# Patient Record
Sex: FEMALE | Race: WHITE | NOT HISPANIC OR LATINO | Employment: FULL TIME | URBAN - NONMETROPOLITAN AREA
[De-identification: names, ages, dates, MRNs, and addresses within clinical notes are randomized per-mention and may not be internally consistent; named-entity substitution may affect disease eponyms.]

---

## 2023-03-23 ENCOUNTER — TELEMEDICINE (OUTPATIENT)
Dept: PRIMARY CARE | Facility: CLINIC | Age: 28
End: 2023-03-23
Payer: COMMERCIAL

## 2023-03-23 DIAGNOSIS — U07.1 COVID-19 VIRUS INFECTION: Primary | ICD-10-CM

## 2023-03-23 PROBLEM — K92.0 HEMATEMESIS/VOMITING BLOOD: Status: ACTIVE | Noted: 2023-03-23

## 2023-03-23 PROBLEM — F33.2: Chronic | Status: ACTIVE | Noted: 2021-07-08

## 2023-03-23 PROBLEM — N30.90 CYSTITIS: Status: ACTIVE | Noted: 2022-12-11

## 2023-03-23 PROBLEM — T14.8XXA BRUISING: Status: ACTIVE | Noted: 2018-06-21

## 2023-03-23 PROBLEM — R07.89 ATYPICAL CHEST PAIN: Status: ACTIVE | Noted: 2018-12-21

## 2023-03-23 PROBLEM — R19.7 DIARRHEA: Status: ACTIVE | Noted: 2017-05-15

## 2023-03-23 PROBLEM — J01.01 ACUTE RECURRENT MAXILLARY SINUSITIS: Status: ACTIVE | Noted: 2023-03-23

## 2023-03-23 PROBLEM — R03.0 ELEVATED BP WITHOUT DIAGNOSIS OF HYPERTENSION: Status: ACTIVE | Noted: 2018-11-12

## 2023-03-23 PROBLEM — H65.193 ACUTE NONSUPPURATIVE OTITIS MEDIA OF BOTH EARS: Status: ACTIVE | Noted: 2017-02-23

## 2023-03-23 PROBLEM — K52.9 GASTROENTERITIS: Status: ACTIVE | Noted: 2017-11-16

## 2023-03-23 PROBLEM — J20.9 ACUTE BRONCHITIS: Status: ACTIVE | Noted: 2017-11-03

## 2023-03-23 PROBLEM — F41.1 GENERALIZED ANXIETY DISORDER: Status: ACTIVE | Noted: 2022-01-12

## 2023-03-23 PROBLEM — R74.8 ABNORMAL LIVER ENZYMES: Status: ACTIVE | Noted: 2017-05-15

## 2023-03-23 PROBLEM — R51.9 CHRONIC DAILY HEADACHE: Status: ACTIVE | Noted: 2023-03-23

## 2023-03-23 PROBLEM — R10.9 FLANK PAIN: Status: ACTIVE | Noted: 2017-05-15

## 2023-03-23 PROBLEM — E66.9 OBESITY: Status: ACTIVE | Noted: 2017-05-05

## 2023-03-23 PROBLEM — M54.9 BACK PAIN: Status: ACTIVE | Noted: 2023-03-23

## 2023-03-23 PROBLEM — M25.561 ACUTE PAIN OF RIGHT KNEE: Status: ACTIVE | Noted: 2017-02-23

## 2023-03-23 PROBLEM — H60.503 ACUTE OTITIS EXTERNA OF BOTH EARS: Status: ACTIVE | Noted: 2017-05-05

## 2023-03-23 PROBLEM — R42 DIZZINESS: Status: ACTIVE | Noted: 2018-06-21

## 2023-03-23 PROBLEM — R68.89 FLU-LIKE SYMPTOMS: Status: ACTIVE | Noted: 2018-01-30

## 2023-03-23 PROBLEM — I10 ESSENTIAL HYPERTENSION: Status: ACTIVE | Noted: 2023-03-23

## 2023-03-23 PROBLEM — J30.9 ALLERGIC RHINITIS DUE TO ALLERGEN: Status: ACTIVE | Noted: 2023-03-23

## 2023-03-23 PROBLEM — R89.9 ABNORMAL LABORATORY TEST: Status: ACTIVE | Noted: 2018-07-02

## 2023-03-23 PROBLEM — R04.0 EPISTAXIS: Status: ACTIVE | Noted: 2023-03-23

## 2023-03-23 PROBLEM — G47.00 INSOMNIA, UNSPECIFIED: Status: ACTIVE | Noted: 2022-01-12

## 2023-03-23 PROBLEM — K64.9 HEMORRHOIDS: Status: ACTIVE | Noted: 2017-12-12

## 2023-03-23 PROBLEM — K92.1 BLOOD IN STOOL: Status: ACTIVE | Noted: 2017-11-16

## 2023-03-23 PROBLEM — N30.00 ACUTE CYSTITIS WITHOUT HEMATURIA: Status: ACTIVE | Noted: 2018-03-08

## 2023-03-23 PROBLEM — R10.11 ABDOMINAL PAIN, RUQ (RIGHT UPPER QUADRANT): Status: ACTIVE | Noted: 2023-03-23

## 2023-03-23 PROBLEM — M54.41 ACUTE BILATERAL LOW BACK PAIN WITH RIGHT-SIDED SCIATICA: Status: ACTIVE | Noted: 2017-02-23

## 2023-03-23 PROBLEM — M54.2 CHRONIC NECK PAIN: Status: ACTIVE | Noted: 2023-03-23

## 2023-03-23 PROBLEM — F41.9 ANXIETY: Status: ACTIVE | Noted: 2018-11-12

## 2023-03-23 PROBLEM — R11.0 NAUSEA: Status: ACTIVE | Noted: 2018-06-21

## 2023-03-23 PROBLEM — G89.29 CHRONIC NECK PAIN: Status: ACTIVE | Noted: 2023-03-23

## 2023-03-23 PROBLEM — R11.10 VOMITING: Status: ACTIVE | Noted: 2017-12-12

## 2023-03-23 PROCEDURE — 99213 OFFICE O/P EST LOW 20 MIN: CPT | Performed by: FAMILY MEDICINE

## 2023-03-23 RX ORDER — ALBUTEROL SULFATE 0.83 MG/ML
2.5 SOLUTION RESPIRATORY (INHALATION) EVERY 6 HOURS PRN
COMMUNITY
Start: 2022-01-11

## 2023-03-23 RX ORDER — CYCLOBENZAPRINE HCL 5 MG
5 TABLET ORAL 3 TIMES DAILY PRN
COMMUNITY
Start: 2021-10-06

## 2023-03-23 RX ORDER — DEXTROAMPHETAMINE SACCHARATE, AMPHETAMINE ASPARTATE, DEXTROAMPHETAMINE SULFATE AND AMPHETAMINE SULFATE 3.75; 3.75; 3.75; 3.75 MG/1; MG/1; MG/1; MG/1
15 TABLET ORAL DAILY
COMMUNITY
Start: 2021-10-11

## 2023-03-23 RX ORDER — ALBUTEROL SULFATE 90 UG/1
2 AEROSOL, METERED RESPIRATORY (INHALATION) EVERY 4 HOURS PRN
COMMUNITY
Start: 2017-10-29

## 2023-03-23 RX ORDER — ALPRAZOLAM 1 MG/1
1 TABLET ORAL NIGHTLY PRN
COMMUNITY
Start: 2022-08-17

## 2023-03-23 RX ORDER — CLONIDINE HYDROCHLORIDE 0.1 MG/1
1 TABLET ORAL NIGHTLY
COMMUNITY
Start: 2020-11-06

## 2023-03-23 RX ORDER — ESZOPICLONE 3 MG/1
3 TABLET, FILM COATED ORAL NIGHTLY
COMMUNITY

## 2023-03-23 RX ORDER — CARIPRAZINE 1.5 MG/1
1.5 CAPSULE, GELATIN COATED ORAL EVERY OTHER DAY
COMMUNITY
Start: 2022-04-21

## 2023-03-23 RX ORDER — BUPROPION HYDROCHLORIDE 150 MG/1
150 TABLET ORAL
COMMUNITY

## 2023-03-23 RX ORDER — ASCORBIC ACID 500 MG
500 TABLET ORAL
COMMUNITY
Start: 2018-03-28

## 2023-03-23 RX ORDER — BENZONATATE 200 MG/1
200 CAPSULE ORAL 3 TIMES DAILY PRN
Qty: 21 CAPSULE | Refills: 0 | Status: SHIPPED | OUTPATIENT
Start: 2023-03-23 | End: 2023-03-30

## 2023-03-23 RX ORDER — FLUOXETINE HYDROCHLORIDE 40 MG/1
40 CAPSULE ORAL
COMMUNITY

## 2023-03-23 RX ORDER — CLONIDINE HYDROCHLORIDE 0.2 MG/1
0.2 TABLET ORAL NIGHTLY
COMMUNITY

## 2023-03-23 RX ORDER — DEXTROAMPHETAMINE SACCHARATE, AMPHETAMINE ASPARTATE MONOHYDRATE, DEXTROAMPHETAMINE SULFATE AND AMPHETAMINE SULFATE 7.5; 7.5; 7.5; 7.5 MG/1; MG/1; MG/1; MG/1
15 CAPSULE, EXTENDED RELEASE ORAL
COMMUNITY
Start: 2019-06-20

## 2023-03-23 RX ORDER — FLUTICASONE PROPIONATE 50 MCG
2 SPRAY, SUSPENSION (ML) NASAL 2 TIMES DAILY
COMMUNITY
Start: 2021-11-30 | End: 2023-03-23

## 2023-03-23 RX ORDER — ARIPIPRAZOLE 2 MG/1
1 TABLET ORAL DAILY
COMMUNITY
Start: 2022-05-11

## 2023-03-27 NOTE — PROGRESS NOTES
Subjective     Diana Motley is a 27 y.o. female who presents for Covid-19 Home Monitoring Visit, Cough, and URI (Covid positive/cough/congestion).       This was completed via telephone due to the restrictions of the COVID-19 pandemic.  All issues as below were discussed and addressed but no physical exam was performed.  If it was felt that the patient should be evaluated in clinic then they were director there.  The patient/parent verbally consented to the visit.     Telemedicine/video call appointment    HPI  Home COVID test was positive for COVID-19 virus infection.  Complaining sinus congestion and tickle cough and chest congestion.  Low-grade fever.  Denies shortness of breath.  Not using Flonase.  Denies shortness of breath.  Recommended Paxlovid.  Denies pregnancy.  No history of renal/kidney disease.    Review of Systems  Dictated as above virtual visit  Objective   Virtual visit virtual visit  Physical Exam    Assessment/Plan     COVID-19 virus infection.  A started on Paxlovid.  DAvised to call 911 or to go to the emergency room as soon as possible if develops high fever or shortness of breath and or pain in the lungs with a cough and breathing and or shortness of breath and or pain in the calf muscles.  She understood verbalized understood and agreed.  Explained adverse pressure medication.    Problem List Items Addressed This Visit          Infectious/Inflammatory    COVID-19 virus infection - Primary    Relevant Medications    nirmatrelvir-ritonavir (PAXLOVID) 300 mg (150 mg x 2)-100 mg tablet therapy pack    benzonatate (Tessalon) 200 mg capsule

## 2023-04-06 ENCOUNTER — TELEMEDICINE (OUTPATIENT)
Dept: PRIMARY CARE | Facility: CLINIC | Age: 28
End: 2023-04-06
Payer: COMMERCIAL

## 2023-04-06 DIAGNOSIS — F33.2 MAJOR DEPRESSIVE DISORDER, RECURRENT, SEVERE W/O PSYCHOTIC BEHAVIOR (MULTI): ICD-10-CM

## 2023-04-06 DIAGNOSIS — U09.9 POST-COVID CHRONIC FATIGUE: ICD-10-CM

## 2023-04-06 DIAGNOSIS — R53.83 OTHER FATIGUE: ICD-10-CM

## 2023-04-06 DIAGNOSIS — N30.00 ACUTE CYSTITIS WITHOUT HEMATURIA: Primary | ICD-10-CM

## 2023-04-06 DIAGNOSIS — G93.32 POST-COVID CHRONIC FATIGUE: ICD-10-CM

## 2023-04-06 PROCEDURE — 99213 OFFICE O/P EST LOW 20 MIN: CPT | Performed by: FAMILY MEDICINE

## 2023-04-06 RX ORDER — NITROFURANTOIN 25; 75 MG/1; MG/1
100 CAPSULE ORAL 2 TIMES DAILY
Qty: 14 CAPSULE | Refills: 0 | Status: SHIPPED | OUTPATIENT
Start: 2023-04-06 | End: 2023-04-13

## 2023-04-09 NOTE — PROGRESS NOTES
Subjective     Diana Motley is a 27 y.o. female who presents for UTI (UTI).    This was completed via telephone due to the restrictions of the COVID-19 pandemic.  All issues as below were discussed and addressed but no physical exam was performed.  If it was felt that the patient should be evaluated in clinic then they were director there.  The patient/parent verbally consented to the visit.     Video call on [email or web address].    HPI  Complaining burning urination and frequent urination.  Denies flank pain.  Denies blood in the urine.  Denies nausea vomiting and diarrhea.    Complaining feeling tired.  Advised on diet exercise.  Recently she had a COVID 19 virus infection and feeling tired.  History of depression.  Denies suicidal.  Denies homicidal.  Keep appointment with specialist.            Review of Systems  Dictated as above  Objective   Virtual visit  Physical Exam  Virtual visit  Assessment/Plan   1.  Acute cystitis.  Started on medication.  Explained adverse effects of medication.  Advised to call 911 or to go to the emergency room as soon as possible if develops abdominal pain/flank pain, pain is getting worse, fever, chills, nausea and vomiting.    2.  Fatigue.  Dictated as above.    3.  Post COVID chronic fatigue.  Dictated as above  4.  Depression.  Counseled for depression.  Denies suicidal.  Denies homicidal.  Keep appointment with the specialist            Problem List Items Addressed This Visit          Genitourinary    Acute cystitis without hematuria - Primary    Relevant Medications    nitrofurantoin, macrocrystal-monohydrate, (Macrobid) 100 mg capsule       Other    Major depressive disorder, recurrent, severe w/o psychotic behavior (CMS/HCC) (Chronic)    Fatigue     Other Visit Diagnoses       Post-COVID chronic fatigue        Relevant Orders    Referral to [facility]

## 2023-05-01 RX ORDER — LEVOTHYROXINE SODIUM 112 UG/1
112 TABLET ORAL
Qty: 30 TABLET | Refills: 2 | OUTPATIENT
Start: 2023-05-01

## 2023-05-01 RX ORDER — LEVOTHYROXINE SODIUM 112 UG/1
112 TABLET ORAL
COMMUNITY
Start: 2022-12-20 | End: 2024-01-16 | Stop reason: SDUPTHER

## 2023-05-01 RX ORDER — TOPIRAMATE 100 MG/1
100 TABLET, FILM COATED ORAL NIGHTLY
COMMUNITY
Start: 2023-03-23

## 2023-05-01 RX ORDER — TOPIRAMATE 100 MG/1
100 TABLET, FILM COATED ORAL NIGHTLY
Qty: 30 TABLET | Refills: 0 | OUTPATIENT
Start: 2023-05-01

## 2024-01-14 PROBLEM — K62.5 RECTAL BLEEDING: Status: RESOLVED | Noted: 2017-12-09 | Resolved: 2024-01-14

## 2024-01-14 PROBLEM — T14.8XXA BRUISING: Status: RESOLVED | Noted: 2018-06-21 | Resolved: 2024-01-14

## 2024-01-14 PROBLEM — J01.01 ACUTE RECURRENT MAXILLARY SINUSITIS: Status: RESOLVED | Noted: 2023-03-23 | Resolved: 2024-01-14

## 2024-01-14 PROBLEM — M25.561 ACUTE PAIN OF RIGHT KNEE: Status: RESOLVED | Noted: 2017-02-23 | Resolved: 2024-01-14

## 2024-01-14 PROBLEM — R89.9 ABNORMAL LABORATORY TEST: Status: RESOLVED | Noted: 2018-07-02 | Resolved: 2024-01-14

## 2024-01-14 PROBLEM — N30.90 CYSTITIS: Status: RESOLVED | Noted: 2022-12-11 | Resolved: 2024-01-14

## 2024-01-14 PROBLEM — M54.9 BACK PAIN: Status: RESOLVED | Noted: 2023-03-23 | Resolved: 2024-01-14

## 2024-01-14 PROBLEM — R42 DIZZINESS: Status: RESOLVED | Noted: 2018-06-21 | Resolved: 2024-01-14

## 2024-01-14 PROBLEM — M54.41 ACUTE BILATERAL LOW BACK PAIN WITH RIGHT-SIDED SCIATICA: Status: RESOLVED | Noted: 2017-02-23 | Resolved: 2024-01-14

## 2024-01-14 PROBLEM — D51.0 PERNICIOUS ANEMIA: Status: RESOLVED | Noted: 2018-07-02 | Resolved: 2024-01-14

## 2024-01-14 PROBLEM — R11.10 VOMITING: Status: RESOLVED | Noted: 2017-12-12 | Resolved: 2024-01-14

## 2024-01-14 PROBLEM — L05.91 PILONIDAL CYST WITHOUT ABSCESS: Status: RESOLVED | Noted: 2017-02-23 | Resolved: 2024-01-14

## 2024-01-14 PROBLEM — R10.11 ABDOMINAL PAIN, RUQ (RIGHT UPPER QUADRANT): Status: RESOLVED | Noted: 2023-03-23 | Resolved: 2024-01-14

## 2024-01-14 PROBLEM — R68.89 FLU-LIKE SYMPTOMS: Status: RESOLVED | Noted: 2018-01-30 | Resolved: 2024-01-14

## 2024-01-14 PROBLEM — R19.7 DIARRHEA: Status: RESOLVED | Noted: 2017-05-15 | Resolved: 2024-01-14

## 2024-01-14 PROBLEM — R03.0 ELEVATED BP WITHOUT DIAGNOSIS OF HYPERTENSION: Status: RESOLVED | Noted: 2018-11-12 | Resolved: 2024-01-14

## 2024-01-14 PROBLEM — G47.00 INSOMNIA, UNSPECIFIED: Status: RESOLVED | Noted: 2022-01-12 | Resolved: 2024-01-14

## 2024-01-14 PROBLEM — R11.0 NAUSEA: Status: RESOLVED | Noted: 2018-06-21 | Resolved: 2024-01-14

## 2024-01-14 PROBLEM — J30.9 ALLERGIC RHINITIS DUE TO ALLERGEN: Status: RESOLVED | Noted: 2023-03-23 | Resolved: 2024-01-14

## 2024-01-14 PROBLEM — K92.0 HEMATEMESIS/VOMITING BLOOD: Status: RESOLVED | Noted: 2023-03-23 | Resolved: 2024-01-14

## 2024-01-14 PROBLEM — R07.89 ATYPICAL CHEST PAIN: Status: RESOLVED | Noted: 2018-12-21 | Resolved: 2024-01-14

## 2024-01-14 PROBLEM — F60.9 PERSONALITY DISORDER, UNSPECIFIED (MULTI): Chronic | Status: ACTIVE | Noted: 2022-03-14

## 2024-01-14 PROBLEM — K92.1 BLOOD IN STOOL: Status: RESOLVED | Noted: 2017-11-16 | Resolved: 2024-01-14

## 2024-01-14 PROBLEM — J20.9 ACUTE BRONCHITIS: Status: RESOLVED | Noted: 2017-11-03 | Resolved: 2024-01-14

## 2024-01-14 PROBLEM — E66.9 OBESITY: Status: RESOLVED | Noted: 2017-05-05 | Resolved: 2024-01-14

## 2024-01-14 PROBLEM — M25.50 POLYARTHRALGIA: Status: ACTIVE | Noted: 2024-01-14

## 2024-01-14 PROBLEM — U07.1 COVID-19 VIRUS INFECTION: Status: RESOLVED | Noted: 2023-03-23 | Resolved: 2024-01-14

## 2024-01-14 PROBLEM — H65.193 ACUTE NONSUPPURATIVE OTITIS MEDIA OF BOTH EARS: Status: RESOLVED | Noted: 2017-02-23 | Resolved: 2024-01-14

## 2024-01-14 PROBLEM — N30.00 ACUTE CYSTITIS WITHOUT HEMATURIA: Status: RESOLVED | Noted: 2018-03-08 | Resolved: 2024-01-14

## 2024-01-14 PROBLEM — R51.9 CHRONIC DAILY HEADACHE: Status: RESOLVED | Noted: 2023-03-23 | Resolved: 2024-01-14

## 2024-01-14 PROBLEM — R04.0 EPISTAXIS: Status: RESOLVED | Noted: 2023-03-23 | Resolved: 2024-01-14

## 2024-01-14 PROBLEM — H60.503 ACUTE OTITIS EXTERNA OF BOTH EARS: Status: RESOLVED | Noted: 2017-05-05 | Resolved: 2024-01-14

## 2024-01-14 PROBLEM — R10.9 FLANK PAIN: Status: RESOLVED | Noted: 2017-05-15 | Resolved: 2024-01-14

## 2024-01-14 PROBLEM — K52.9 GASTROENTERITIS: Status: RESOLVED | Noted: 2017-11-16 | Resolved: 2024-01-14

## 2024-01-15 NOTE — PROGRESS NOTES
Subjective     HPI   Diana Motley is a 28 y.o. year old female patient with presenting to clinic with concern for   Chief Complaint   Patient presents with    New Patient Visit       Diana presents to clinic to establish care as a new patient. She was formerly seen by Dr. Enriquez.     Unintended weight loss 15lbs in the past 6 months.     Needs preop clearance for dental extractions. Will need hematology clearance also. Due for labs today.    Hx - Coagulation factor XII, also known as Dara factor- rare bleeding disorder, but it causes abnormal clotting rather than bleeding.    Dry mouth, intermittent mouth sores. Dental damaged, has feeling some pain in one left upper tooth x2 days.     Patient Active Problem List   Diagnosis    Abnormal liver enzymes    Acquired hypothyroidism    Allergic rhinitis    Amenorrhea due to Depo Provera    Anxiety    Asthma    Chronic neck pain    Essential hypertension    Fatigue    Generalized anxiety disorder    GERD (gastroesophageal reflux disease)    Hemorrhoids    Insomnia due to medical condition    Major depressive disorder, recurrent, severe w/o psychotic behavior (CMS/HCC)    Chronic migraine    Migraine headache    ADHD (attention deficit hyperactivity disorder)    Overactive bladder    Personality disorder, unspecified (CMS/HCC)    Polyarthralgia       Past Medical History:   Diagnosis Date    Attention-deficit hyperactivity disorder, unspecified type     Adult ADHD    COVID-19 virus infection 03/23/2023    Fissure in ano 12/01/2014    Ganglion, right wrist     Ganglion of right wrist    Hematemesis/vomiting blood 03/23/2023    Other specified health status     No pertinent past medical history    Overactive bladder     OAB (overactive bladder)    Pernicious anemia 07/02/2018    Personal history of other endocrine, nutritional and metabolic disease     History of hypothyroidism    Pilonidal cyst without abscess 02/23/2017    Rectal bleeding 12/09/2017    Sciatica  02/19/2015      Past Surgical History:   Procedure Laterality Date    COLONOSCOPY  12/13/2017    Colonoscopy    OTHER SURGICAL HISTORY  12/13/2017    Anal Fissurectomy      Family History   Problem Relation Name Age of Onset    No Known Problems Mother      No Known Problems Father        Social History     Tobacco Use    Smoking status: Never    Smokeless tobacco: Never   Substance Use Topics    Alcohol use: Not Currently        Current Outpatient Medications:     albuterol 2.5 mg /3 mL (0.083 %) nebulizer solution, Inhale 3 mL (2.5 mg) every 6 hours if needed., Disp: , Rfl:     albuterol 90 mcg/actuation inhaler, Inhale 2 puffs every 4 hours if needed for shortness of breath., Disp: , Rfl:     amphetamine-dextroamphetamine XR (Adderall XR) 30 mg 24 hr capsule, Take 15 mg by mouth once daily in the morning. Take before meals., Disp: , Rfl:     buPROPion XL (Wellbutrin XL) 150 mg 24 hr tablet, Take 1 tablet (150 mg) by mouth once daily in the morning. Take before meals., Disp: , Rfl:     cloNIDine (Catapres) 0.2 mg tablet, Take 1 tablet (0.2 mg) by mouth once daily at bedtime., Disp: , Rfl:     eszopiclone (Lunesta) 3 mg tablet, Take 1 tablet (3 mg) by mouth once daily at bedtime., Disp: , Rfl:     FLUoxetine (PROzac) 40 mg capsule, Take 1 capsule (40 mg) by mouth once daily., Disp: , Rfl:     hydrOXYzine pamoate (Vistaril) 50 mg capsule, TAKE 1 CAPSULE BY MOUTH EVERY 6 HOURS AS NEEDED FOR RESTLESSNESS, Disp: , Rfl:     levothyroxine (Synthroid, Levoxyl) 112 mcg tablet, Take 1 tablet (112 mcg) by mouth., Disp: , Rfl:     topiramate (Topamax) 100 mg tablet, Take 1 tablet (100 mg) by mouth once daily at bedtime., Disp: , Rfl:     traZODone (Desyrel) 50 mg tablet, TAKE 1 TABLET BY MOUTH AT BEDTIME MAY REPEAT X 1 AS NEEDED FOR INSOMNIA, Disp: , Rfl:     ALPRAZolam (Xanax) 1 mg tablet, Take 1 tablet (1 mg) by mouth as needed at bedtime for anxiety., Disp: , Rfl:     amphetamine-dextroamphetamine (Adderall) 15 mg tablet,  "Take 1 tablet (15 mg) by mouth once daily., Disp: , Rfl:     ARIPiprazole (Abilify) 2 mg tablet, Take 1 tablet (2 mg) by mouth once daily., Disp: , Rfl:     ascorbic acid (Vitamin C) 500 mg tablet, Take 1 tablet (500 mg) by mouth., Disp: , Rfl:     cloNIDine (Catapres) 0.1 mg tablet, Take 1 tablet (0.1 mg) by mouth once daily at bedtime., Disp: , Rfl:     cyclobenzaprine (Flexeril) 5 mg tablet, Take 1 tablet (5 mg) by mouth 3 times a day as needed., Disp: , Rfl:     Vraylar 1.5 mg capsule, Take 1 capsule (1.5 mg) by mouth every other day., Disp: , Rfl:      Review of Systems  Constitutional: Denies fever  HEENT: Denies ST, earache  CVS: Denies Chest pain  Pulmonary: Denies wheezing, SOB  GI: Denies N/V  : Denies dysuria  Musculoskeletal:  Denies myalgia  Neuro: Denies focal weakness or numbness.  Skin: Denies Rashes.  *Review of Systems is negative unless otherwise mentioned in HPI or ROS above.    Objective   /74   Pulse 95   Temp 37 °C (98.6 °F)   Ht 1.549 m (5' 1\")   Wt 58.5 kg (129 lb)   SpO2 98%   BMI 24.37 kg/m²  reviewed Body mass index is 24.37 kg/m².     Physical Exam  Constitutional: NAD.  Resting comfortably.  Head: Atraumatic, normocephalic.  ENT: Moist oral mucosa. Nasal mucosa wnl. No visible dental abscess.  Cardiac: Regular rate & rhythm.   Pulmonary: Lungs clear bilat  GI: Soft, Nontender, nondistended.   Musculoskeletal: No peripheral edema.   Skin: No evidence of trauma. No rashes  Psych: Intact judgement and insight.    .Assessment/Plan   Problem List Items Addressed This Visit    None  Visit Diagnoses         Codes    JOCELYN positive    -  Primary R76.8    Relevant Orders    Referral to Rheumatology    Hypothyroidism, unspecified type     E03.9    Relevant Medications    levothyroxine (Synthroid, Levoxyl) 112 mcg tablet    Malar rash     R21    Relevant Orders    Referral to Rheumatology    Recurrent oral ulcers     K13.79    Relevant Orders    Referral to Rheumatology    Dry mouth " and eyes     R68.2, H04.123    Relevant Orders    Referral to Rheumatology    Dental infection     K04.7    Relevant Medications    clindamycin (Cleocin) 300 mg capsule    Borderline hyperlipidemia     E78.5    Relevant Orders    CBC    Comprehensive Metabolic Panel    TSH with reflex to Free T4 if abnormal    Lipid Panel    Vitamin D insufficiency     E55.9    Relevant Orders    Vitamin D 25-Hydroxy,Total (for eval of Vitamin D levels)    Insomnia, unspecified type     G47.00    Relevant Orders    Referral to Adult Sleep Medicine

## 2024-01-16 ENCOUNTER — OFFICE VISIT (OUTPATIENT)
Dept: PRIMARY CARE | Facility: CLINIC | Age: 29
End: 2024-01-16
Payer: COMMERCIAL

## 2024-01-16 VITALS
BODY MASS INDEX: 24.35 KG/M2 | OXYGEN SATURATION: 98 % | HEIGHT: 61 IN | DIASTOLIC BLOOD PRESSURE: 74 MMHG | SYSTOLIC BLOOD PRESSURE: 110 MMHG | WEIGHT: 129 LBS | HEART RATE: 95 BPM | TEMPERATURE: 98.6 F

## 2024-01-16 DIAGNOSIS — Z12.9 SCREENING FOR CANCER: ICD-10-CM

## 2024-01-16 DIAGNOSIS — E78.5 BORDERLINE HYPERLIPIDEMIA: ICD-10-CM

## 2024-01-16 DIAGNOSIS — E55.9 VITAMIN D INSUFFICIENCY: ICD-10-CM

## 2024-01-16 DIAGNOSIS — R76.8 ANA POSITIVE: Primary | ICD-10-CM

## 2024-01-16 DIAGNOSIS — E03.9 HYPOTHYROIDISM, UNSPECIFIED TYPE: ICD-10-CM

## 2024-01-16 DIAGNOSIS — R68.2 DRY MOUTH AND EYES: ICD-10-CM

## 2024-01-16 DIAGNOSIS — K04.7 DENTAL INFECTION: ICD-10-CM

## 2024-01-16 DIAGNOSIS — R21 MALAR RASH: ICD-10-CM

## 2024-01-16 DIAGNOSIS — G47.00 INSOMNIA, UNSPECIFIED TYPE: ICD-10-CM

## 2024-01-16 DIAGNOSIS — H04.123 DRY MOUTH AND EYES: ICD-10-CM

## 2024-01-16 DIAGNOSIS — K13.79 RECURRENT ORAL ULCERS: ICD-10-CM

## 2024-01-16 PROCEDURE — 1036F TOBACCO NON-USER: CPT | Performed by: PHYSICIAN ASSISTANT

## 2024-01-16 PROCEDURE — 3074F SYST BP LT 130 MM HG: CPT | Performed by: PHYSICIAN ASSISTANT

## 2024-01-16 PROCEDURE — 99204 OFFICE O/P NEW MOD 45 MIN: CPT | Performed by: PHYSICIAN ASSISTANT

## 2024-01-16 PROCEDURE — 3078F DIAST BP <80 MM HG: CPT | Performed by: PHYSICIAN ASSISTANT

## 2024-01-16 RX ORDER — TRAZODONE HYDROCHLORIDE 50 MG/1
TABLET ORAL
COMMUNITY

## 2024-01-16 RX ORDER — CLINDAMYCIN HYDROCHLORIDE 300 MG/1
300 CAPSULE ORAL 2 TIMES DAILY
Qty: 20 CAPSULE | Refills: 0 | Status: SHIPPED | OUTPATIENT
Start: 2024-01-16 | End: 2024-01-26

## 2024-01-16 RX ORDER — HYDROXYZINE PAMOATE 50 MG/1
CAPSULE ORAL
COMMUNITY

## 2024-01-16 RX ORDER — LEVOTHYROXINE SODIUM 112 UG/1
112 TABLET ORAL
Qty: 90 TABLET | Refills: 3 | Status: SHIPPED | OUTPATIENT
Start: 2024-01-16 | End: 2024-05-31 | Stop reason: ALTCHOICE

## 2024-01-16 ASSESSMENT — PATIENT HEALTH QUESTIONNAIRE - PHQ9
SUM OF ALL RESPONSES TO PHQ9 QUESTIONS 1 AND 2: 0
2. FEELING DOWN, DEPRESSED OR HOPELESS: NOT AT ALL
1. LITTLE INTEREST OR PLEASURE IN DOING THINGS: NOT AT ALL

## 2024-04-01 ENCOUNTER — TELEPHONE (OUTPATIENT)
Dept: PRIMARY CARE | Facility: CLINIC | Age: 29
End: 2024-04-01
Payer: COMMERCIAL

## 2024-04-23 ENCOUNTER — APPOINTMENT (OUTPATIENT)
Dept: PRIMARY CARE | Facility: CLINIC | Age: 29
End: 2024-04-23
Payer: COMMERCIAL

## 2024-05-30 ENCOUNTER — LAB (OUTPATIENT)
Dept: LAB | Facility: LAB | Age: 29
End: 2024-05-30
Payer: COMMERCIAL

## 2024-05-30 DIAGNOSIS — E78.5 BORDERLINE HYPERLIPIDEMIA: ICD-10-CM

## 2024-05-30 DIAGNOSIS — E55.9 VITAMIN D INSUFFICIENCY: ICD-10-CM

## 2024-05-30 LAB
25(OH)D3 SERPL-MCNC: 42 NG/ML (ref 30–100)
ALBUMIN SERPL BCP-MCNC: 4.6 G/DL (ref 3.4–5)
ALP SERPL-CCNC: 53 U/L (ref 33–110)
ALT SERPL W P-5'-P-CCNC: 10 U/L (ref 7–45)
ANION GAP SERPL CALC-SCNC: 12 MMOL/L (ref 10–20)
AST SERPL W P-5'-P-CCNC: 12 U/L (ref 9–39)
BILIRUB SERPL-MCNC: 0.4 MG/DL (ref 0–1.2)
BUN SERPL-MCNC: 12 MG/DL (ref 6–23)
CALCIUM SERPL-MCNC: 9.7 MG/DL (ref 8.6–10.3)
CHLORIDE SERPL-SCNC: 105 MMOL/L (ref 98–107)
CHOLEST SERPL-MCNC: 143 MG/DL (ref 0–199)
CHOLESTEROL/HDL RATIO: 2.6
CO2 SERPL-SCNC: 25 MMOL/L (ref 21–32)
CREAT SERPL-MCNC: 1.04 MG/DL (ref 0.5–1.05)
EGFRCR SERPLBLD CKD-EPI 2021: 75 ML/MIN/1.73M*2
ERYTHROCYTE [DISTWIDTH] IN BLOOD BY AUTOMATED COUNT: 13.4 % (ref 11.5–14.5)
GLUCOSE SERPL-MCNC: 89 MG/DL (ref 74–99)
HCT VFR BLD AUTO: 40.2 % (ref 36–46)
HDLC SERPL-MCNC: 55.7 MG/DL
HGB BLD-MCNC: 13.5 G/DL (ref 12–16)
LDLC SERPL CALC-MCNC: 78 MG/DL
MCH RBC QN AUTO: 29.4 PG (ref 26–34)
MCHC RBC AUTO-ENTMCNC: 33.6 G/DL (ref 32–36)
MCV RBC AUTO: 88 FL (ref 80–100)
NON HDL CHOLESTEROL: 87 MG/DL (ref 0–149)
NRBC BLD-RTO: 0 /100 WBCS (ref 0–0)
PLATELET # BLD AUTO: 304 X10*3/UL (ref 150–450)
POTASSIUM SERPL-SCNC: 3.4 MMOL/L (ref 3.5–5.3)
PROT SERPL-MCNC: 7.8 G/DL (ref 6.4–8.2)
RBC # BLD AUTO: 4.59 X10*6/UL (ref 4–5.2)
SODIUM SERPL-SCNC: 139 MMOL/L (ref 136–145)
T4 FREE SERPL-MCNC: 1.01 NG/DL (ref 0.61–1.12)
TRIGL SERPL-MCNC: 47 MG/DL (ref 0–149)
TSH SERPL-ACNC: 4.33 MIU/L (ref 0.44–3.98)
VLDL: 9 MG/DL (ref 0–40)
WBC # BLD AUTO: 5.5 X10*3/UL (ref 4.4–11.3)

## 2024-05-30 PROCEDURE — 82306 VITAMIN D 25 HYDROXY: CPT

## 2024-05-30 PROCEDURE — 36415 COLL VENOUS BLD VENIPUNCTURE: CPT

## 2024-05-30 NOTE — PROGRESS NOTES
Subjective     HPI   Diana Motley is a 28 y.o. year old female patient with presenting to clinic with concern for   Chief Complaint   Patient presents with    Follow-up     3 mth. Has not been able to make all her referral scheduled appt yet but will work on them. Appetite not great.     Thyroid Problem     Possible. Body feels weird.     Bradycardia     Low heart rate on Monday was in 40s. Felt like she was going to pass out. Mouth is dry. Trouble sleeping. Mind foggy.     Ear Problem     Right ear has scarring. Feels like she has water in her ear if she turns in her head.     Suspicious Skin Lesion     Mole in groin area.        Has felt down, tired. Depressed.  HR has been low when standing at times. Watch noted HR in 40s and she felt dizzy. Sat down and felt better. Period has lasted longer than usual. Usually 3 days, now on day #6.    Has upcoming psych appointment. Has felt more tired and depressed. Please discuss meds with psychiatrist.      Hx - Coagulation factor XII, also known as Dara factor- rare bleeding disorder, but it causes abnormal clotting rather than bleeding.      Patient Active Problem List   Diagnosis    Abnormal liver enzymes    Acquired hypothyroidism    Allergic rhinitis    Amenorrhea due to Depo Provera    Anxiety    Asthma ([facility])    Chronic neck pain    Essential hypertension    Fatigue    Generalized anxiety disorder    GERD (gastroesophageal reflux disease)    Hemorrhoids    Insomnia due to medical condition    Major depressive disorder, recurrent, severe w/o psychotic behavior (Multi)    Chronic migraine    Migraine headache    ADHD (attention deficit hyperactivity disorder)    Overactive bladder    Personality disorder, unspecified (Multi)    Polyarthralgia       Past Medical History:   Diagnosis Date    Attention-deficit hyperactivity disorder, unspecified type     Adult ADHD    COVID-19 virus infection 03/23/2023    Fissure in ano 12/01/2014    Ganglion, right wrist      Ganglion of right wrist    Hematemesis/vomiting blood 03/23/2023    Other specified health status     No pertinent past medical history    Overactive bladder     OAB (overactive bladder)    Pernicious anemia 07/02/2018    Personal history of other endocrine, nutritional and metabolic disease     History of hypothyroidism    Pilonidal cyst without abscess 02/23/2017    Rectal bleeding 12/09/2017    Sciatica 02/19/2015      Past Surgical History:   Procedure Laterality Date    COLONOSCOPY  12/13/2017    Colonoscopy    OTHER SURGICAL HISTORY  12/13/2017    Anal Fissurectomy      Family History   Problem Relation Name Age of Onset    No Known Problems Mother      No Known Problems Father        Social History     Tobacco Use    Smoking status: Never    Smokeless tobacco: Never   Substance Use Topics    Alcohol use: Not Currently        Current Outpatient Medications:     albuterol 2.5 mg /3 mL (0.083 %) nebulizer solution, Inhale 3 mL (2.5 mg) every 6 hours if needed., Disp: , Rfl:     albuterol 90 mcg/actuation inhaler, Inhale 2 puffs every 4 hours if needed for shortness of breath., Disp: , Rfl:     ALPRAZolam (Xanax) 1 mg tablet, Take 1 tablet (1 mg) by mouth as needed at bedtime for anxiety., Disp: , Rfl:     amphetamine-dextroamphetamine (Adderall) 15 mg tablet, Take 1 tablet (15 mg) by mouth once daily., Disp: , Rfl:     amphetamine-dextroamphetamine XR (Adderall XR) 30 mg 24 hr capsule, Take 15 mg by mouth once daily in the morning. Take before meals., Disp: , Rfl:     ARIPiprazole (Abilify) 2 mg tablet, Take 1 tablet (2 mg) by mouth once daily., Disp: , Rfl:     ascorbic acid (Vitamin C) 500 mg tablet, Take 1 tablet (500 mg) by mouth., Disp: , Rfl:     buPROPion XL (Wellbutrin XL) 150 mg 24 hr tablet, Take 1 tablet (150 mg) by mouth once daily in the morning. Take before meals., Disp: , Rfl:     cloNIDine (Catapres) 0.1 mg tablet, Take 1 tablet (0.1 mg) by mouth once daily at bedtime., Disp: , Rfl:     cloNIDine  "(Catapres) 0.2 mg tablet, Take 1 tablet (0.2 mg) by mouth once daily at bedtime., Disp: , Rfl:     cyclobenzaprine (Flexeril) 5 mg tablet, Take 1 tablet (5 mg) by mouth 3 times a day as needed., Disp: , Rfl:     eszopiclone (Lunesta) 3 mg tablet, Take 1 tablet (3 mg) by mouth once daily at bedtime., Disp: , Rfl:     FLUoxetine (PROzac) 40 mg capsule, Take 1 capsule (40 mg) by mouth once daily., Disp: , Rfl:     hydrOXYzine pamoate (Vistaril) 50 mg capsule, TAKE 1 CAPSULE BY MOUTH EVERY 6 HOURS AS NEEDED FOR RESTLESSNESS, Disp: , Rfl:     topiramate (Topamax) 100 mg tablet, Take 1 tablet (100 mg) by mouth once daily at bedtime., Disp: , Rfl:     traZODone (Desyrel) 50 mg tablet, TAKE 1 TABLET BY MOUTH AT BEDTIME MAY REPEAT X 1 AS NEEDED FOR INSOMNIA, Disp: , Rfl:     Vraylar 1.5 mg capsule, Take 1 capsule (1.5 mg) by mouth every other day., Disp: , Rfl:     levothyroxine (Synthroid, Levoxyl) 125 mcg tablet, Take 1 tablet (125 mcg) by mouth early in the morning.. Take on an empty stomach at the same time each day, either 30 to 60 minutes prior to breakfast, Disp: 90 tablet, Rfl: 3     Review of Systems  Constitutional: Denies fever  HEENT: Denies ST, earache  CVS: Denies Chest pain  Pulmonary: Denies wheezing, SOB  GI: Denies N/V  : Denies dysuria  Musculoskeletal:  Denies myalgia  Neuro: Denies focal weakness or numbness.  Skin: Denies Rashes.  *Review of Systems is negative unless otherwise mentioned in HPI or ROS above.    Objective   /70   Pulse 88   Temp 36.4 °C (97.6 °F)   Ht 1.549 m (5' 1\")   Wt 61.5 kg (135 lb 9.6 oz)   SpO2 92%   BMI 25.62 kg/m²  reviewed Body mass index is 25.62 kg/m².     Physical Exam  Constitutional: NAD.  Resting comfortably.  Head: Atraumatic, normocephalic.  ENT: Moist oral mucosa. Nasal mucosa wnl. R EAC swelling. Unable to visualize TM. L EAC and TM wnl.   Cardiac: Regular rate & rhythm. No ectopy. No murmur.  Pulmonary: Lungs clear bilat  GI: Soft, Nontender, " nondistended.   Musculoskeletal: No peripheral edema.   Skin: No evidence of trauma. No rashes. Small macular brown mole suprapubic area. Symmetrical, uniformly colored.  Psych: Intact judgement and insight.    .Assessment/Plan   Problem List Items Addressed This Visit    None  Visit Diagnoses         Codes    Hypothyroidism, unspecified type    -  Primary E03.9    Relevant Medications    levothyroxine (Synthroid, Levoxyl) 125 mcg tablet    Acute otitis externa of right ear, unspecified type     H60.501    Relevant Medications    ciprofloxacin-dexamethasone (CiproDEX) otic suspension    Nausea     R11.0    Relevant Medications    ondansetron ODT (Zofran-ODT) 4 mg disintegrating tablet    Hypokalemia     E87.6    Relevant Medications    potassium chloride CR (Klor-Con M20) 20 mEq ER tablet    Bradycardia     R00.1    Relevant Orders    Referral to Cardiology

## 2024-05-31 ENCOUNTER — OFFICE VISIT (OUTPATIENT)
Dept: PRIMARY CARE | Facility: CLINIC | Age: 29
End: 2024-05-31
Payer: COMMERCIAL

## 2024-05-31 VITALS
HEIGHT: 61 IN | DIASTOLIC BLOOD PRESSURE: 70 MMHG | TEMPERATURE: 97.6 F | OXYGEN SATURATION: 92 % | HEART RATE: 88 BPM | SYSTOLIC BLOOD PRESSURE: 104 MMHG | BODY MASS INDEX: 25.6 KG/M2 | WEIGHT: 135.6 LBS

## 2024-05-31 DIAGNOSIS — E03.9 HYPOTHYROIDISM, UNSPECIFIED TYPE: ICD-10-CM

## 2024-05-31 DIAGNOSIS — H60.501 ACUTE OTITIS EXTERNA OF RIGHT EAR, UNSPECIFIED TYPE: ICD-10-CM

## 2024-05-31 DIAGNOSIS — E03.9 HYPOTHYROIDISM, UNSPECIFIED TYPE: Primary | ICD-10-CM

## 2024-05-31 DIAGNOSIS — E87.6 HYPOKALEMIA: ICD-10-CM

## 2024-05-31 DIAGNOSIS — R00.1 BRADYCARDIA: ICD-10-CM

## 2024-05-31 DIAGNOSIS — R11.0 NAUSEA: ICD-10-CM

## 2024-05-31 PROCEDURE — 3078F DIAST BP <80 MM HG: CPT | Performed by: PHYSICIAN ASSISTANT

## 2024-05-31 PROCEDURE — 3074F SYST BP LT 130 MM HG: CPT | Performed by: PHYSICIAN ASSISTANT

## 2024-05-31 PROCEDURE — 99214 OFFICE O/P EST MOD 30 MIN: CPT | Performed by: PHYSICIAN ASSISTANT

## 2024-05-31 PROCEDURE — 1036F TOBACCO NON-USER: CPT | Performed by: PHYSICIAN ASSISTANT

## 2024-05-31 RX ORDER — ONDANSETRON 4 MG/1
4 TABLET, ORALLY DISINTEGRATING ORAL EVERY 8 HOURS PRN
Qty: 9 TABLET | Refills: 0 | Status: SHIPPED | OUTPATIENT
Start: 2024-05-31 | End: 2024-06-07

## 2024-05-31 RX ORDER — LEVOTHYROXINE SODIUM 125 UG/1
125 TABLET ORAL DAILY
Qty: 90 TABLET | Refills: 3 | Status: SHIPPED | OUTPATIENT
Start: 2024-05-31 | End: 2025-05-31

## 2024-05-31 RX ORDER — LEVOTHYROXINE SODIUM 112 UG/1
112 TABLET ORAL
Qty: 90 TABLET | Refills: 3 | Status: CANCELLED | OUTPATIENT
Start: 2024-05-31 | End: 2025-05-31

## 2024-05-31 RX ORDER — CIPROFLOXACIN AND DEXAMETHASONE 3; 1 MG/ML; MG/ML
4 SUSPENSION/ DROPS AURICULAR (OTIC) 2 TIMES DAILY
Qty: 7.5 ML | Refills: 0 | Status: SHIPPED | OUTPATIENT
Start: 2024-05-31 | End: 2024-06-07

## 2024-05-31 RX ORDER — POTASSIUM CHLORIDE 20 MEQ/1
20 TABLET, EXTENDED RELEASE ORAL 2 TIMES DAILY
Qty: 6 TABLET | Refills: 0 | Status: SHIPPED | OUTPATIENT
Start: 2024-05-31 | End: 2024-06-03

## 2024-07-17 ENCOUNTER — APPOINTMENT (OUTPATIENT)
Dept: SLEEP MEDICINE | Facility: CLINIC | Age: 29
End: 2024-07-17
Payer: COMMERCIAL

## 2024-07-17 VITALS
DIASTOLIC BLOOD PRESSURE: 85 MMHG | OXYGEN SATURATION: 99 % | BODY MASS INDEX: 25.89 KG/M2 | SYSTOLIC BLOOD PRESSURE: 131 MMHG | HEART RATE: 81 BPM | WEIGHT: 137 LBS

## 2024-07-17 DIAGNOSIS — E66.3 OVERWEIGHT (BMI 25.0-29.9): ICD-10-CM

## 2024-07-17 DIAGNOSIS — G47.10 HYPERSOMNIA: ICD-10-CM

## 2024-07-17 DIAGNOSIS — I10 ESSENTIAL HYPERTENSION: ICD-10-CM

## 2024-07-17 DIAGNOSIS — F33.2 MAJOR DEPRESSIVE DISORDER, RECURRENT, SEVERE W/O PSYCHOTIC BEHAVIOR (MULTI): Chronic | ICD-10-CM

## 2024-07-17 DIAGNOSIS — Z72.821 INADEQUATE SLEEP HYGIENE: ICD-10-CM

## 2024-07-17 DIAGNOSIS — G47.9 SLEEP DISTURBANCE: ICD-10-CM

## 2024-07-17 DIAGNOSIS — G47.19 EXCESSIVE DAYTIME SLEEPINESS: ICD-10-CM

## 2024-07-17 DIAGNOSIS — F41.9 ANXIETY: ICD-10-CM

## 2024-07-17 DIAGNOSIS — Z91.89 AT RISK FOR NARCOLEPSY: ICD-10-CM

## 2024-07-17 DIAGNOSIS — G47.00 INSOMNIA, UNSPECIFIED TYPE: Primary | ICD-10-CM

## 2024-07-17 DIAGNOSIS — Z78.9 NEVER SMOKED TOBACCO: ICD-10-CM

## 2024-07-17 DIAGNOSIS — R53.83 FATIGUE, UNSPECIFIED TYPE: ICD-10-CM

## 2024-07-17 PROCEDURE — 3075F SYST BP GE 130 - 139MM HG: CPT

## 2024-07-17 PROCEDURE — 99204 OFFICE O/P NEW MOD 45 MIN: CPT

## 2024-07-17 PROCEDURE — 1036F TOBACCO NON-USER: CPT

## 2024-07-17 PROCEDURE — 3079F DIAST BP 80-89 MM HG: CPT

## 2024-07-17 ASSESSMENT — PATIENT HEALTH QUESTIONNAIRE - PHQ9
1. LITTLE INTEREST OR PLEASURE IN DOING THINGS: MORE THAN HALF THE DAYS
SUM OF ALL RESPONSES TO PHQ9 QUESTIONS 1 AND 2: 3
2. FEELING DOWN, DEPRESSED OR HOPELESS: SEVERAL DAYS

## 2024-07-17 ASSESSMENT — ANXIETY QUESTIONNAIRES
2. NOT BEING ABLE TO STOP OR CONTROL WORRYING: NOT AT ALL
3. WORRYING TOO MUCH ABOUT DIFFERENT THINGS: NOT AT ALL
6. BECOMING EASILY ANNOYED OR IRRITABLE: SEVERAL DAYS
5. BEING SO RESTLESS THAT IT IS HARD TO SIT STILL: NEARLY EVERY DAY
1. FEELING NERVOUS, ANXIOUS, OR ON EDGE: SEVERAL DAYS
7. FEELING AFRAID AS IF SOMETHING AWFUL MIGHT HAPPEN: NOT AT ALL
GAD7 TOTAL SCORE: 6
4. TROUBLE RELAXING: SEVERAL DAYS

## 2024-07-17 ASSESSMENT — SLEEP AND FATIGUE QUESTIONNAIRES
SITING INACTIVE IN A PUBLIC PLACE LIKE A CLASS ROOM OR A MOVIE THEATER: WOULD NEVER DOZE
DIFFICULTY_FALLING_ASLEEP: VERY SEVERE
ESS-CHAD TOTAL SCORE: 0
SLEEP_PROBLEM_NOTICEABLE_TO_OTHERS: VERY MUCH NOTICEABLE
DIFFICULTY_STAYING_ASLEEP: VERY SEVERE
WAKING_TOO_EARLY: VERY SEVERE
SATISFACTION_WITH_CURRENT_SLEEP_PATTERN: DISSATISFIED
HOW LIKELY ARE YOU TO NOD OFF OR FALL ASLEEP IN A CAR, WHILE STOPPED FOR A FEW MINUTES IN TRAFFIC: WOULD NEVER DOZE
HOW LIKELY ARE YOU TO NOD OFF OR FALL ASLEEP WHILE SITTING QUIETLY AFTER LUNCH WITHOUT ALCOHOL: WOULD NEVER DOZE
HOW LIKELY ARE YOU TO NOD OFF OR FALL ASLEEP WHILE SITTING AND READING: WOULD NEVER DOZE
HOW LIKELY ARE YOU TO NOD OFF OR FALL ASLEEP WHILE SITTING AND TALKING TO SOMEONE: WOULD NEVER DOZE
HOW LIKELY ARE YOU TO NOD OFF OR FALL ASLEEP WHILE LYING DOWN TO REST IN THE AFTERNOON WHEN CIRCUMSTANCES PERMIT: WOULD NEVER DOZE
SLEEP_PROBLEM_INTERFERES_DAILY_ACTIVITIES: VERY MUCH NOTICEABLE
WORRIED_DISTRESSED_DUE_TO_SLEEP: MUCH
HOW LIKELY ARE YOU TO NOD OFF OR FALL ASLEEP WHILE WATCHING TV: WOULD NEVER DOZE
HOW LIKELY ARE YOU TO NOD OFF OR FALL ASLEEP WHEN YOU ARE A PASSENGER IN A CAR FOR AN HOUR WITHOUT A BREAK: WOULD NEVER DOZE

## 2024-07-17 NOTE — PROGRESS NOTES
Patient: Diana Motley  : 1995 AGE: 28 y.o. SEX:female   MRN: [id number]   Provider: [facility]   Service Date: 24   PCP: Lynnette Dalal PA-C   Referred by: Lynnette Dalal PA-C             [facility] [facility]  NEW PATIENT VISIT NOTE      PATIENT INFORMATION     The patient's referring provider is: Lynnette Dalal PA-C   The patient's Primary Care Provider: Lynnette Dalal PA-C    HISTORY OF PRESENT ILLNESS     Patient ID: Diana Motley is a 28 y.o. female who presents to a [facility] for evaluation for difficulty staying asleep, difficulty falling asleep, Excessive Daytime Sleepiness (EDS), Fatigue, and Hypersomnia.    Patient is here alone today.  The patient has pertinent hx of Insomnia, sleep disturbance, difficulty falling asleep, difficulty staying asleep, inadequate sleep hygiene, EDS, fatigue, hypersomnia, overweight, HTN, dyslipidemia, Asthma, never smoker, allergic rhinitis, GERD, ADHD, personality disorder, depression, anxiety, hypothyroidism, anemia, polyarthralgia, migraines, neck pain, and chronic pain.     24  Patient here today for evaluation for Insomnia per her MH and PCP providers. She has been previously evaluated by another sleep provider was but told she is self-inducing sleep issues.   She reports since around freshman year of high school she started having sleeping issues and chronic daytime sleepiness and fatigue. She was started on the journey of sleep aides to help her. She is currently on Clonidine, Lunesta, Hydroxyzine and Trazodone for sleep. She will sometimes have to take two doses of Lunesta to be able to sleep but then she runs out of medication before she can get her new prescription. She reports that there are times that she no matter the amount of sleep aides she takes, she will be unable to sleep and be awake for 24 - 48 hrs straight.  She denied hx of Bipolar, does report significant hx of depression, anxiety and ADHD. She has been on stimulants for many years. She reports that she has two doses, never takes her second dose after 12 pm.   She reports that she previously was tested for sleep apnea and narcolepsy. She reports that her sleep study was negative for ORLIN but they did state narcolepsy. She has never been evaluated or treated for narcolepsy. We discussed narcolepsy today in clinic. One treatment option is stimulants which she is on. She does reports that she is unable to function during the day without her Adderall. I will request records from [facility] for sleep testing. If positive we will discuss different treatment options. If unable to obtain records or inconclusive testing, I recommend re-testing with PSG + MSLT.  We discussed Insomnia treatments. 1st line therapy for Insomnia is CBT-I therapy. I discussed that at this time,  does not have a CBT-I provider, the closest providers are at [facility]. I encouraged the patient to establish with the CBT-I clinic.   Given her various complaints and unavailable testing results at time of appointment. We have mutually agreed to not change anything at this time. We will follow-up after testing.     SLEEP HISTORY     SLEEP-WAKE SCHEDULE  Bedtime:  830 - 9 pm on workdays, 9 - 10 p on off-workdays  Subjective sleep latency: few minutes to hours, variable  Difficulty falling asleep: Yes  due to mind-racing / rumination / worry and unknown  Number of awakenings: 3 - 4 times per night   Falls back asleep in few minutes to hours  Difficulty staying asleep: Yes  due to mind-racing / rumination / worry, anxiety, and unknown reasons  Final wake time:  530 am on workdays, 6a m on off-workdays  Out of bed time:  6 am on workdays, 7 am on off-workdays  Shift work: DENIED  Naps:  YES  Feels rested after a nap: NO  Average sleep duration (excluding naps): varies    SLEEP ENVIRONMENT  Sleep location:  bed  Sleep status: sleeps alone  Preferred sleep position: side  TV in bedroom: no  Room is dark:  Yes  Room is quiet: Yes  Room is cool: Yes  Bed comfort: good    SLEEP HABITS   Smoking: never  ETOH: DENIED  Marijuana: DENIED  Caffeine:  YES  Sleep aids: Lunesta, Trazodone and Hydroxyzine     WEIGHT: stable    Claustrophobia: No     STOP-BAN  ESS: 0   JIN: 26  EDILIA-7: 6  PHQ-2:  POSITIVE  little interest or pleasure = 2  down, depressed or hopeless = 1      REVIEW OF SYSTEMS     REVIEW OF SYSTEMS  Sleep-related ROS:  Night symptoms: Patient denies symptoms of snoring or breathing concerns at night  Morning symptoms: POSITIVE for unrefreshing sleep and morning headache  Daytime symptoms POSITIVE for excessive daytime sleepiness, fatigue, trouble staying focused in daytime, and irritability in daytime  Hypersomnia / narcolepsy symptoms: POSITIVE for daytime sleepiness since young age and disturbed nocturnal sleep  Parasomnia symptoms: Patient denies symptoms of parasomnia such as sleepwalking, sleeptalking, sleep-eating, acting out dreams, and nightmares.   Movements in sleep: Patient denies problematic movements in sleep such as seizures during sleep, frequent leg kicks / jerks while asleep, and sleep-related bruxism.    RLS screen:  RLSSCREEN: - Sensations: Patient does not have unusual sensations in their extremities that cause an urge to move them     Naps:   Yes. Naps ARE/ARENOT: are not refreshing.  Fatigue: struggles to carry out day to day responsibilities.    All other systems have been reviewed and are negative.      ALLERGIES AND MEDICATIONS     ALLERGIES  Allergies   Allergen Reactions    Morphine Shortness of breath    Nsaids (Non-Steroidal Anti-Inflammatory Drug) Bleeding     Hx Dara factor (Coagulation factor XII). NSAIDs cause abnormal clotting leading to bleeding, epistaxis and abnormal bleeding/bruising    Amoxicillin Hives and Unknown    Levetiracetam Other and Unknown     Other reaction(s):  Other: See Comments   Feels like bee stings    Feels like bee stings       MEDICATIONS  Current Outpatient Medications   Medication Sig Dispense Refill    albuterol 2.5 mg /3 mL (0.083 %) nebulizer solution Inhale 3 mL (2.5 mg) every 6 hours if needed.      albuterol 90 mcg/actuation inhaler Inhale 2 puffs every 4 hours if needed for shortness of breath.      ALPRAZolam (Xanax) 1 mg tablet Take 1 tablet (1 mg) by mouth as needed at bedtime for anxiety.      amphetamine-dextroamphetamine (Adderall) 15 mg tablet Take 1 tablet (15 mg) by mouth once daily.      amphetamine-dextroamphetamine XR (Adderall XR) 30 mg 24 hr capsule Take 15 mg by mouth once daily in the morning. Take before meals.      ARIPiprazole (Abilify) 2 mg tablet Take 1 tablet (2 mg) by mouth once daily.      ascorbic acid (Vitamin C) 500 mg tablet Take 1 tablet (500 mg) by mouth.      buPROPion XL (Wellbutrin XL) 150 mg 24 hr tablet Take 1 tablet (150 mg) by mouth once daily in the morning. Take before meals.      cloNIDine (Catapres) 0.1 mg tablet Take 1 tablet (0.1 mg) by mouth once daily at bedtime.      cloNIDine (Catapres) 0.2 mg tablet Take 1 tablet (0.2 mg) by mouth once daily at bedtime.      cyclobenzaprine (Flexeril) 5 mg tablet Take 1 tablet (5 mg) by mouth 3 times a day as needed.      eszopiclone (Lunesta) 3 mg tablet Take 1 tablet (3 mg) by mouth once daily at bedtime.      FLUoxetine (PROzac) 40 mg capsule Take 1 capsule (40 mg) by mouth once daily.      hydrOXYzine pamoate (Vistaril) 50 mg capsule TAKE 1 CAPSULE BY MOUTH EVERY 6 HOURS AS NEEDED FOR RESTLESSNESS      levothyroxine (Synthroid, Levoxyl) 125 mcg tablet Take 1 tablet (125 mcg) by mouth early in the morning.. Take on an empty stomach at the same time each day, either 30 to 60 minutes prior to breakfast 90 tablet 3    topiramate (Topamax) 100 mg tablet Take 1 tablet (100 mg) by mouth once daily at bedtime.      traZODone (Desyrel) 50 mg tablet TAKE 1 TABLET BY MOUTH AT  BEDTIME MAY REPEAT X 1 AS NEEDED FOR INSOMNIA      Vraylar 1.5 mg capsule Take 1 capsule (1.5 mg) by mouth every other day.       No current facility-administered medications for this visit.         PAST HISTORY     PERTINENT PAST MEDICAL HISTORY: See HPI    PERTINENT PAST SURGICAL HISTORY for Sleep Medicine:  non-contributory    PERTINENT FAMILY HISTORY for Sleep Medicine:  Patient denies family history of any sleep disorder.  Patient denies family history of sleep apnea.    PERTINENT SOCIAL HISTORY:  She  reports that she has never smoked. She has never used smokeless tobacco. She reports that she does not currently use alcohol. She reports that she does not use drugs. She currently lives alone and employed full-time    Active Problems, Allergy List, Medication List, and PMH/PSH/FH/Social Hx have been reviewed and reconciled in chart. No significant changes unless documented in the pertinent chart section. Updates made when necessary.       PHYSICAL EXAM     VITAL SIGNS: /85   Pulse 81   Wt 62.1 kg (137 lb)   SpO2 99%   BMI 25.89 kg/m²     CURRENT WEIGHT:   Vitals:    07/17/24 1535   Weight: 62.1 kg (137 lb)      BMI: Body mass index is 25.89 kg/m².     PREVIOUS WEIGHTS:  Wt Readings from Last 3 Encounters:   07/17/24 62.1 kg (137 lb)   05/31/24 61.5 kg (135 lb 9.6 oz)   01/16/24 58.5 kg (129 lb)       PHYSICAL EXAMINATION  General appearance: awake alert in NAD  Affect: normal  Skin: no rash noted to face  HEENT: Nasal congestion absent  Teeth: normal dentition  Lungs: no cough.  Extr: moves all four extremities  Neuro: normal speech    RESULTS/DATA     Iron (ug/dL)   Date Value   01/27/2023 63     Iron Saturation (%)   Date Value   01/27/2023 15 (L)     TIBC (ug/dL)   Date Value   01/27/2023 424       Bicarbonate   Date Value Ref Range Status   05/30/2024 25 21 - 32 mmol/L Final       PAP Adherence  Not applicable      ASSESSMENT/PLAN     Assessment/Plan   Diana Motley is a 28 y.o. female presents  today in [facility] with the following problems:    CHRONIC SLEEP ONSET/ SLEEP MAINTENANCE INSOMNIA  - due to combination of inadequate sleep hygiene, depression, anxiety, untreated sleep apnea, chronic pain, irregular sleep schedule, and mental health disorder. Per review of medication list, it does NOT appear medications are a contributing factor.  - discussed with patient good sleep hygiene  - continue with medications as prescribed  - I encouraged patient to get established with CBT-I clinic at [facility]      INADEQUATE SLEEP HYGIENE / EXCESSIVE DAYTIME SLEEPINESS (EDS) / FATIGUE  + SLEEP DISTURBANCES + HYPERSOMNIA  - due to combination of inadequate sleep hygiene, depression, anxiety, untreated sleep apnea, chronic pain, irregular sleep schedule, and mental health disorder. Per review of medication list, it does NOT appear medications are a contributing factor.  - discussed with patient good sleep hygiene   - Important to get good daily dose of natural sunlight to help wakefulness & energy  - Reduce artificial lighting at night, especially light from screens (i.e. cellphones, TV, tablets)  - Exercise is helpful for your mental and physical health  - Have a consistent bedtime routine 1 hours prior to your usual bedtime  - If going to take a nap, it should be less than 30 minutes and prior to 3 pm  - Limit alcohol and caffeine use   - Avoidance of marijuana and/or CBD use    Suspected NARCOLEPSY  - patient reports previously sleep study indicated narcolepsy, will attempt to obtain records  - currently on Adderall daily (BID), can not function without medication  - Will order an overnight sleep study, followed by MSLT the next day  - Perform urine toxicology prior to sleep study.  - Scheduled naps  -Consolidate night time sleep.     OVERWEIGHT  - BMI today Body mass index is 25.89 kg/m².   - Encouraged patient to lose weight with diet and exercise.     HYPERTENSION  - BP today 131/85  -  well controlled with medication, denies any issues with management   - encouraged daily exercise with healthy diet for BP and ORLIN management  - Defer management to PCP    DEPRESSION / ANXIETY screen  DEPRESSION  / ANXIETY   + screens  - denies any SI, HI or hallucinations   - currently on medication  - defer management to Psychiatry     SMOKING STATUS screen  - never a smoker     All of patient's questions were answered. She verbalizes understanding and agreement with my assessment and plan.

## 2024-07-17 NOTE — LETTER
NARCOLEPSY is a chronic neurologic disorder characterized by a decreased ability to regulate sleep-wake cycles. It is characterized by severe and persistent sleepiness in daytime and significantly fragmented disturbed sleep at night. The main symptom of narcolepsy is excessive daytime sleepiness that leads to an irrepressible need to sleep and daytime lapses into sleep. Other commonly seen symptoms include the following:    Cataplexy is sudden and brief loss of muscle control and tone when experiencing strong intense emotion like laughter, surprise, or anger.   Hypnagogic hallucinations and hypnopompic hallucinations are vivid dream-like hallucinations occurring while falling asleep and upon waking up respectively, oftentimes frightening.   Sleep paralysis is a feeling of not being able to talk or move for a brief period either when falling asleep or just after waking up. Touching the person usually causes the sleep paralysis to disappear, although it usually just ends on its own.   Disturbed nighttime sleep  Automatic behaviors involve continuing a familiar, routine, or boring task without being fully aware or later remembering doing it. A person is actually asleep during the activity.   Still, other symptoms include intense fatigue with continual lack of energy, depression, poor concentration and memory, school failure, acting out dreams, and/or sleep eating.    Although it affects 1 in every 2000 people, it is often misdiagnosed as psychiatric disorder (depression, ADHD, etc), and can lead to reduced sleep quality, reduced quality of life, and delays in treatment. It affects males and females equally and usually develops during adolescence with most people having first symptoms between age 15 to 30.  However, symptoms of narcolepsy can occur at any age. Based on research evidence, narcolepsy is most commonly caused by decreased amount of brain chemicals called Hypocretin (also known as orexin) which, in turn,  is due to loss of hypocretin-producing cells in the brain by autoimmune process.     Treatment of narcolepsy involves medications, lifestyle changes, and educating other people.     Medications: One or more medications are usually prescribed to control the symptoms of narcolepsy. The excessive daytime sleepiness is treated with stimulant while cataplexy is treated with anti-depressants.     Lifestyle changes  Try to plan and schedule 2-3 brief naps (~10-15 minutes) in strategic times during the day to control daytime sleepiness and reduce the number of unplanned sleep attacks. Inform teachers or work supervisors about the condition and hopefully, nap accommodations can be fulfilled.   Avoid alcohol and sedative-hypnotic use as these substances may exacerbate sleepiness.   Avoid caffeine in late afternoon or early evening so that sleep at night is not disturbed.   Maintain and follow a strict and regular sleep-wake schedule that ensures adequate sleep.   Practice good sleep hygiene and shift work hygiene, if the latter is applicable  Expose self to bright light in daytime as bright light can be STIMULATING.  Exercise daily in daytime as it can be beneficial and STIMULATING. Increase physical activity often by getting up and walking around to reduce urge to sleep. Avoid boring or repetitive tasks.   Avoid driving vehicle or operating heavy machinery when sleepy until daytime sleepiness is well-treated. A person driving while sleepy is 5 times more likely to have an accident. If you feel sleepy, pull over and take a short power nap (sleep for less than 30 minutes). Otherwise, ask somebody to drive you.    Narcolepsy itself is not deadly but it may be dangerous if episodes occur during driving, operating machinery, or similar activities. Possible issues and complications with narcolepsy include difficulty with social activities, injuries and accidents if sleep attacks occur during the activity, and side effects of  medications used to treat the disorder.

## 2024-07-18 PROBLEM — E66.3 OVERWEIGHT (BMI 25.0-29.9): Status: ACTIVE | Noted: 2024-07-18

## 2024-07-18 PROBLEM — Z72.821 INADEQUATE SLEEP HYGIENE: Status: ACTIVE | Noted: 2024-07-18

## 2024-07-18 PROBLEM — G47.19 EXCESSIVE DAYTIME SLEEPINESS: Status: ACTIVE | Noted: 2024-07-18

## 2024-07-18 PROBLEM — Z91.89 AT RISK FOR NARCOLEPSY: Status: ACTIVE | Noted: 2024-07-18

## 2024-07-18 PROBLEM — G47.9 SLEEP DISTURBANCE: Status: ACTIVE | Noted: 2024-07-18

## 2024-07-18 PROBLEM — G47.10 HYPERSOMNIA: Status: ACTIVE | Noted: 2024-07-18

## 2024-07-18 PROBLEM — G47.00 INSOMNIA: Status: ACTIVE | Noted: 2024-07-18

## 2024-07-30 PROBLEM — E66.3 OVERWEIGHT (BMI 25.0-29.9): Status: RESOLVED | Noted: 2024-07-18 | Resolved: 2024-07-30

## 2024-07-30 PROBLEM — R74.8 ABNORMAL LIVER ENZYMES: Status: RESOLVED | Noted: 2017-05-15 | Resolved: 2024-07-30

## 2024-07-30 RX ORDER — PROPRANOLOL HYDROCHLORIDE 10 MG/1
10 TABLET ORAL 2 TIMES DAILY
COMMUNITY

## 2024-07-30 RX ORDER — BUPROPION HYDROCHLORIDE 150 MG/1
1 TABLET ORAL
COMMUNITY
Start: 2024-07-24

## 2024-07-30 RX ORDER — FLUOXETINE 10 MG/1
10 TABLET ORAL DAILY
COMMUNITY

## 2024-07-31 ENCOUNTER — APPOINTMENT (OUTPATIENT)
Dept: PRIMARY CARE | Facility: CLINIC | Age: 29
End: 2024-07-31
Payer: COMMERCIAL

## 2024-07-31 VITALS
WEIGHT: 135.8 LBS | OXYGEN SATURATION: 99 % | DIASTOLIC BLOOD PRESSURE: 84 MMHG | SYSTOLIC BLOOD PRESSURE: 126 MMHG | TEMPERATURE: 97.1 F | HEART RATE: 96 BPM | BODY MASS INDEX: 25.64 KG/M2 | HEIGHT: 61 IN

## 2024-07-31 DIAGNOSIS — Z01.818 PREOPERATIVE EXAMINATION: Primary | ICD-10-CM

## 2024-07-31 DIAGNOSIS — E03.9 HYPOTHYROIDISM, UNSPECIFIED TYPE: ICD-10-CM

## 2024-07-31 DIAGNOSIS — B35.1 ONYCHOMYCOSIS: ICD-10-CM

## 2024-07-31 PROBLEM — D68.2: Status: ACTIVE | Noted: 2024-07-31

## 2024-07-31 PROCEDURE — 3074F SYST BP LT 130 MM HG: CPT | Performed by: PHYSICIAN ASSISTANT

## 2024-07-31 PROCEDURE — 1036F TOBACCO NON-USER: CPT | Performed by: PHYSICIAN ASSISTANT

## 2024-07-31 PROCEDURE — 99214 OFFICE O/P EST MOD 30 MIN: CPT | Performed by: PHYSICIAN ASSISTANT

## 2024-07-31 PROCEDURE — 3079F DIAST BP 80-89 MM HG: CPT | Performed by: PHYSICIAN ASSISTANT

## 2024-07-31 PROCEDURE — 3008F BODY MASS INDEX DOCD: CPT | Performed by: PHYSICIAN ASSISTANT

## 2024-07-31 RX ORDER — CICLOPIROX 80 MG/ML
1 SOLUTION TOPICAL NIGHTLY
Qty: 6.6 ML | Refills: 5 | Status: SHIPPED | OUTPATIENT
Start: 2024-07-31 | End: 2025-01-27

## 2024-07-31 ASSESSMENT — PATIENT HEALTH QUESTIONNAIRE - PHQ9
1. LITTLE INTEREST OR PLEASURE IN DOING THINGS: NOT AT ALL
2. FEELING DOWN, DEPRESSED OR HOPELESS: NOT AT ALL
SUM OF ALL RESPONSES TO PHQ9 QUESTIONS 1 AND 2: 0

## 2024-07-31 NOTE — PATIENT INSTRUCTIONS
PRIOR INVESTIGATIONS  SLEEP STUDIES  2018/01/18 .2 minutes her perception was that she slept 120-180 minutes  Sleep efficiency 91.3%  AHI 0.0  PLMI 15  Bruxism

## 2024-07-31 NOTE — PROGRESS NOTES
Subjective    Expected procedure: Jeannette teeth removal  Surgeon: [facility]  Date of Procedure: Pending      *Coagulation factor XII, also known as Dara factor- rare bleeding disorder, but it causes abnormal clotting rather than bleeding. Dr Solorzano [facility]. Spoke to her this morning.    Discussed with her hematologist this morning.  Ok to have one wisdom teeth removed at a time, but not all 4 at a time d/t bleeding risk per hematology.    Anxiety raising her /100 at dentist visit. Treat medical anxiety with clonidine prior to dental visit    BP Readings from Last 5 Encounters:   07/31/24 126/84   07/17/24 131/85   05/31/24 104/70   01/16/24 110/74   07/11/22 116/81         Specialists  Sleep medicine Susan Mccall[facility]  Mental health- Karen Love CNP      Allergies   Allergen Reactions    Morphine Shortness of breath    Nsaids (Non-Steroidal Anti-Inflammatory Drug) Bleeding     Hx Dara factor (Coagulation factor XII). NSAIDs cause abnormal clotting leading to bleeding, epistaxis and abnormal bleeding/bruising    Amoxicillin Hives and Unknown    Levetiracetam Other and Unknown     Other reaction(s): Other: See Comments   Feels like bee stings    Feels like bee stings      History    Patient Active Problem List   Diagnosis    Acquired hypothyroidism    Allergic rhinitis    Amenorrhea due to Depo Provera    Anxiety    Asthma ([facility])    Chronic neck pain    Essential hypertension    Fatigue    Generalized anxiety disorder    GERD (gastroesophageal reflux disease)    Hemorrhoids    Insomnia due to medical condition    Major depressive disorder, recurrent, severe w/o psychotic behavior (Multi)    Chronic migraine    ADHD (attention deficit hyperactivity disorder)    Overactive bladder    Personality disorder, unspecified (Multi)    Polyarthralgia    At risk for narcolepsy    Hypersomnia    Sleep disturbance    Excessive daytime sleepiness    Inadequate sleep hygiene    Insomnia    Dara  factor deficiency (Multi)       Past Medical History:   Diagnosis Date    COVID-19 virus infection 03/23/2023    Hematemesis/vomiting blood 03/23/2023    Overactive bladder     OAB (overactive bladder)    Pernicious anemia 07/02/2018    Pilonidal cyst without abscess 02/23/2017    Sciatica 02/19/2015      Past Surgical History:   Procedure Laterality Date    ANUS SURGERY  12/13/2017    Anal Fissurectomy    COLONOSCOPY  12/13/2017    Colonoscopy      Family History   Problem Relation Name Age of Onset    No Known Problems Mother      No Known Problems Father        Social History     Tobacco Use    Smoking status: Never    Smokeless tobacco: Never   Substance Use Topics    Alcohol use: Not Currently          Current Outpatient Medications:     albuterol 2.5 mg /3 mL (0.083 %) nebulizer solution, Inhale 3 mL (2.5 mg) every 6 hours if needed., Disp: , Rfl:     albuterol 90 mcg/actuation inhaler, Inhale 2 puffs every 4 hours if needed for shortness of breath., Disp: , Rfl:     amphetamine-dextroamphetamine XR (Adderall XR) 30 mg 24 hr capsule, Take 15 mg by mouth once daily in the morning. Take before meals., Disp: , Rfl:     buPROPion XL (Wellbutrin XL) 150 mg 24 hr tablet, Take 1 tablet (150 mg) by mouth once daily in the morning. Take before meals., Disp: , Rfl:     cloNIDine (Catapres) 0.2 mg tablet, Take 1 tablet (0.2 mg) by mouth once daily at bedtime., Disp: , Rfl:     eszopiclone (Lunesta) 3 mg tablet, Take 1 tablet (3 mg) by mouth once daily at bedtime., Disp: , Rfl:     FLUoxetine (PROzac) 10 mg tablet, Take 1 tablet (10 mg) by mouth once daily. With 40mg, Disp: , Rfl:     FLUoxetine (PROzac) 40 mg capsule, Take 1 capsule (40 mg) by mouth once daily., Disp: , Rfl:     hydrOXYzine pamoate (Vistaril) 50 mg capsule, TAKE 1 CAPSULE BY MOUTH EVERY 6 HOURS AS NEEDED FOR RESTLESSNESS, Disp: , Rfl:     levothyroxine (Synthroid, Levoxyl) 125 mcg tablet, Take 1 tablet (125 mcg) by mouth early in the morning.. Take on an  empty stomach at the same time each day, either 30 to 60 minutes prior to breakfast, Disp: 90 tablet, Rfl: 3    propranolol (Inderal) 10 mg tablet, Take 1 tablet (10 mg) by mouth 2 times a day., Disp: , Rfl:     topiramate (Topamax) 100 mg tablet, Take 1 tablet (100 mg) by mouth once daily at bedtime., Disp: , Rfl:     traZODone (Desyrel) 50 mg tablet, TAKE 1 TABLET BY MOUTH AT BEDTIME MAY REPEAT X 1 AS NEEDED FOR INSOMNIA, Disp: , Rfl:     ciclopirox (Penlac) 8 % solution, Apply 1 Application topically once daily at bedtime. Remove with alcohol every 7 days; continue therapy until nail clearance, Disp: 6.6 mL, Rfl: 5     Review of Systems    Constitutional: Denies fever  HEENT: Denies ST, earache  CVS: Denies Chest pain  Pulmonary: Denies wheezing, SOB  GI: Denies N/V  : Denies dysuria  Musculoskeletal:  Denies myalgia  Neuro: Denies focal weakness or numbness.  Skin: Denies Rashes.  *Review of Systems is negative unless otherwise mentioned in HPI or ROS above.    Objective    Vitals:    07/31/24 1507   BP: 126/84   Pulse: 96   Temp: 36.2 °C (97.1 °F)   SpO2: 99%     Body mass index is 25.66 kg/m².     Exam    Constitutional: NAD.  Resting comfortably.  Head: Atraumatic, normocephalic.  ENT: moist oral mucosa. Nasal mucosa mildly edematous and nonerythematous. Posterior oropharynx nonerythematous with mild clear posterior pharyngeal streaking.  TM: Bilat TM nonerythematous, pearly grey, landmarks intact. EAC wnl bilat.  Eyes: PERRLA. EOM intact.   Lymph: No anterior cervical chain lymphadenopathy or submandibular lymphadenopathy.   Cardiac: Regular rate & rhythm.   Pulmonary: Lungs clear bilat  GI: Soft, Nontender, nondistended.   Musculoskeletal: No peripheral edema.   Skin: No evidence of trauma. No rashes Yellow thickened toenails  Psych: Intact judgement and insight.      Results        Plan    MDM    Any Advanced Directives and Power of  paperwork will be available under EPIC EMR under advanced  directives.    A presurgical medical evaluation was completed on this patient and baseline health is in stable condition.     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    Assessment    Problem List Items Addressed This Visit    None  Visit Diagnoses       Preoperative examination    -  Primary    Hypothyroidism, unspecified type        Relevant Orders    TSH    Onychomycosis        Relevant Medications    ciclopirox (Penlac) 8 % solution

## 2025-01-28 ENCOUNTER — APPOINTMENT (OUTPATIENT)
Dept: PRIMARY CARE | Facility: CLINIC | Age: 30
End: 2025-01-28
Payer: COMMERCIAL

## 2025-04-09 ENCOUNTER — APPOINTMENT (OUTPATIENT)
Dept: PRIMARY CARE | Facility: CLINIC | Age: 30
End: 2025-04-09
Payer: COMMERCIAL